# Patient Record
Sex: MALE | Race: ASIAN | NOT HISPANIC OR LATINO | URBAN - METROPOLITAN AREA
[De-identification: names, ages, dates, MRNs, and addresses within clinical notes are randomized per-mention and may not be internally consistent; named-entity substitution may affect disease eponyms.]

---

## 2018-02-08 ENCOUNTER — OFFICE VISIT (OUTPATIENT)
Dept: INTERNAL MEDICINE | Facility: CLINIC | Age: 18
End: 2018-02-08
Payer: COMMERCIAL

## 2018-02-08 VITALS
SYSTOLIC BLOOD PRESSURE: 122 MMHG | HEART RATE: 81 BPM | DIASTOLIC BLOOD PRESSURE: 78 MMHG | TEMPERATURE: 97 F | HEIGHT: 62 IN | BODY MASS INDEX: 26.86 KG/M2 | WEIGHT: 145.94 LBS | OXYGEN SATURATION: 98 %

## 2018-02-08 DIAGNOSIS — Z00.00 PREVENTATIVE HEALTH CARE: Primary | ICD-10-CM

## 2018-02-08 PROCEDURE — 99203 OFFICE O/P NEW LOW 30 MIN: CPT | Mod: PO | Performed by: FAMILY MEDICINE

## 2018-02-08 PROCEDURE — 99999 PR PBB SHADOW E&M-NEW PATIENT-LVL III: CPT | Mod: PBBFAC,,, | Performed by: FAMILY MEDICINE

## 2018-02-08 PROCEDURE — 99385 PREV VISIT NEW AGE 18-39: CPT | Mod: S$GLB,,, | Performed by: FAMILY MEDICINE

## 2018-02-08 NOTE — PROGRESS NOTES
CHIEF COMPLAINT  Annual Exam ([employer])      HISTORY OF PRESENT ILLNESS (PREVENTIVE SERVICES)    HEALTH MAINTENANCE INTERVENTIONS - UP TO DATE  The patient has no Health Maintenance topics of status Not Due    HEALTH MAINTENANCE INTERVENTIONS - DUE OR DUE SOON  Health Maintenance Due   Topic Date Due    Lipid Panel  1998    HPV Vaccines (1 of 3 - Male 3 Dose Series) 10/17/2009    TETANUS VACCINE  10/17/2016    Influenza Vaccine  08/01/2017       · HYPERTENSION SCREENING: negative  BP Readings from Last 3 Encounters:   02/08/18 122/78   05/06/17 127/84   01/09/17 (!) 118/94        · OBESITY SCREENING: negative  Wt Readings from Last 3 Encounters:   02/08/18 66.2 kg (145 lb 15.1 oz) (37 %, Z= -0.32)*   05/06/17 63.9 kg (140 lb 12.8 oz) (33 %, Z= -0.43)*   01/09/17 62.1 kg (137 lb) (29 %, Z= -0.55)*     * Growth percentiles are based on [facility] 2-20 Years data.     BMI Readings from Last 3 Encounters:   02/08/18 26.69 kg/m² (86 %, Z= 1.07)*   05/06/17 25.75 kg/m² (84 %, Z= 0.98)*   01/09/17 25.06 kg/m² (81 %, Z= 0.87)*     * Growth percentiles are based on [facility] 2-20 Years data.        · DYSLIPIDEMIA SCREENING: not indicated  No results found for: CHOL, TRIG, HDL, LDLCALC, NONHDLCHOL    · DIABETES SCREENING: not indicated  No results found for: HGBA1C, GLU    · HIV SCREENING: recommended, he gave informed refusal   No results found for: [id number]     · SEXUALLY TRANSMITTED INFECTION SCREENING: recommended, he gave informed refusal    · TOBACCO USE SCREENING: negative   reports that he has never smoked. He has never used smokeless tobacco.    · ALCOHOL MISUSE SCREENING: negative   reports that he does not drink alcohol.    · DEPRESSION SCREENING: negative    · DIET: good    · PHYSICAL ACTIVITY: excellent    · IMMUNIZATIONS: reported as up to date according to current CDC guidelines.    · VISION SCREENING: current - not needed at this time       Problem List Items Addressed This Visit     None      Visit Diagnoses      "Preventative health care    -  Primary          REVIEW OF SYSTEMS  CONSTITUTIONAL: No fever reported.  CARDIOVASCULAR: No angina reported.  PULMONARY: No hemoptysis reported.  PSYCHIATRIC: No mood instability reported.  NEUROLOGIC: No seizures reported.  ENDOCRINE: No polydipsia reported.  GASTROINTESTINAL: No blood in stool reported.  GENITOURINARY: No hematuria reported.  ENT: No acute hearing changes reported.  OPHTHALMOLOGIC: No acute vision changes reported.  HEMATOLOGIC: No bleeding problems reported.  MUSCULOSKELETAL: No recent injuries reported.  DERMATOLOGIC: No rash reported.  REMAINDER OF COMPLETE REVIEW OF SYSTEMS is negative or noncontributory to present illness except as noted herein.     PHYSICAL EXAM  Vitals:    02/08/18 1304   BP: 122/78   BP Location: Right arm   Patient Position: Sitting   BP Method: Medium (Manual)   Pulse: 81   Temp: 97.2 °F (36.2 °C)   TempSrc: Tympanic   SpO2: 98%   Weight: 66.2 kg (145 lb 15.1 oz)   Height: 5' 2" (1.575 m)     CONSTITUTIONAL: Vital signs noted. No apparent distress. Does not appear acutely ill or septic. Appears adequately hydrated.  EYE: Sclerae anicteric. Lids and conjunctiva unremarkable.  ENT: External ENT unremarkable. Oropharynx moist. Lips and tongue unremarkable. Ear canals and visualized tympanic membranes are unremarkable. Hearing grossly intact.  NECK: Trachea midline. Thyroid nontender.  LYMPH: No cervical or supraclavicular lymphadenopathy.  PULM: Lungs clear. Breathing unlabored.  CV: Auscultation reveals regular rate and rhythm without murmur, gallop or rub. No carotid bruit.   GI: Soft and nontender. Bowel sounds normal. No appreciable organomegaly or abdominal mass on palpation.  GENITOURINARY: Normal appearing external male genitalia. No inguinal hernias.  DERM: Skin warm and moist with normal turgor.  NEURO: There are no gross focal motor deficits or gross deficits of cranial nerves III-XII.  PSYCH: Alert and " oriented x 3. Mood is grossly neutral. Affect appropriate. Judgment and insight not grossly compromised.  MSK: Grossly normal stance and gait.    PAST MEDICAL HISTORY, FAMILY HISTORY, SOCIAL HISTORY, CURRENT MEDICATION LIST, and ALLERGY LIST reviewed by me (TARIQ Worthy MD) and are updated consistent with the patient's report.    ASSESSMENT and PLAN  Preventative health care        Medication List with Changes/Refills   Discontinued Medications    AMOXICILLIN (AMOXIL) 875 MG TABLET    Take 1 tablet (875 mg total) by mouth every 12 (twelve) hours.    FLUTICASONE (FLONASE) 50 MCG/ACTUATION NASAL SPRAY    1-2 sprays by Each Nare route once daily.    LORATADINE (CLARITIN) 10 MG TABLET    Take 1 tablet (10 mg total) by mouth once daily.    PROMETHAZINE-DEXTROMETHORPHAN (PROMETHAZINE-DM) 6.25-15 MG/5 ML SYRP    1 teaspoon PO Q 8 hrs PRN cough. DO NOT DRIVE AFTER TAKING MED    SULFAMETHOXAZOLE-TRIMETHOPRIM 800-160MG (BACTRIM DS) 800-160 MG TAB    Take 1 tablet by mouth 2 (two) times daily.       Follow-up in about 1 year (around 2/8/2019) for wellness and preventive services.    ABOUT THIS DOCUMENTATION:  · The order of the conditions listed in the HPI is one of convenience and does not necessarily reflect the chronology of the appointment, nor the relative importance of a condition. It is possible that additional description or status details about condition(s) may be found elsewhere in the documentation for today's encounter.  · Documentation entered by me for this encounter was done in part using speech-recognition technology. Although I have made an effort to ensure accuracy, malapropisms may exist and should be interpreted in context.                        -TARIQ Worthy MD    There are no Patient Instructions on file for this visit.

## 2019-11-27 ENCOUNTER — OFFICE VISIT (OUTPATIENT)
Dept: INTERNAL MEDICINE | Facility: CLINIC | Age: 19
End: 2019-11-27
Payer: COMMERCIAL

## 2019-11-27 VITALS
HEIGHT: 63 IN | DIASTOLIC BLOOD PRESSURE: 76 MMHG | BODY MASS INDEX: 26.83 KG/M2 | WEIGHT: 151.44 LBS | TEMPERATURE: 99 F | SYSTOLIC BLOOD PRESSURE: 110 MMHG

## 2019-11-27 DIAGNOSIS — R21 RASH: Primary | ICD-10-CM

## 2019-11-27 PROCEDURE — 3008F PR BODY MASS INDEX (BMI) DOCUMENTED: ICD-10-PCS | Mod: CPTII,S$GLB,, | Performed by: NURSE PRACTITIONER

## 2019-11-27 PROCEDURE — 3008F BODY MASS INDEX DOCD: CPT | Mod: CPTII,S$GLB,, | Performed by: NURSE PRACTITIONER

## 2019-11-27 PROCEDURE — 99214 PR OFFICE/OUTPT VISIT, EST, LEVL IV, 30-39 MIN: ICD-10-PCS | Mod: S$GLB,,, | Performed by: NURSE PRACTITIONER

## 2019-11-27 PROCEDURE — 99999 PR PBB SHADOW E&M-EST. PATIENT-LVL III: ICD-10-PCS | Mod: PBBFAC,,, | Performed by: NURSE PRACTITIONER

## 2019-11-27 PROCEDURE — 99999 PR PBB SHADOW E&M-EST. PATIENT-LVL III: CPT | Mod: PBBFAC,,, | Performed by: NURSE PRACTITIONER

## 2019-11-27 PROCEDURE — 99214 OFFICE O/P EST MOD 30 MIN: CPT | Mod: S$GLB,,, | Performed by: NURSE PRACTITIONER

## 2019-11-27 RX ORDER — TRIAMCINOLONE ACETONIDE 0.25 MG/G
CREAM TOPICAL 2 TIMES DAILY
Qty: 1 TUBE | Refills: 0 | Status: SHIPPED | OUTPATIENT
Start: 2019-11-27

## 2019-11-27 NOTE — PROGRESS NOTES
Lb Friedman 19 y.o. male     Chief Complaint:  Chief Complaint   Patient presents with    Rash     x's a few months       History of Present Illness:  Pt is new to provider, but established in practice and presents for rash to his back abdomen and left ankle for a few months.  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  Typically feels like a prick on initiation and progresses into multiple bumps.  Denies fever/chills/SOB.    Exam:  Review of Systems   Constitutional: Negative for chills, diaphoresis, fever, malaise/fatigue and weight loss.   HENT: Negative for hearing loss.    Eyes: Negative for blurred vision.   Respiratory: Negative for cough and shortness of breath.    Cardiovascular: Negative for chest pain and leg swelling.   Gastrointestinal: Negative for abdominal pain, nausea and vomiting.   Genitourinary: Negative for dysuria and hematuria.   Musculoskeletal: Negative for myalgias.   Skin: Positive for rash (back, abdomen and left ankle ).   Neurological: Negative for dizziness and headaches.   Endo/Heme/Allergies: Negative for environmental allergies.     Physical Exam   Constitutional: He is oriented to person, place, and time. He appears well-developed and well-nourished. He is cooperative. No distress.   HENT:   Head: Normocephalic and atraumatic.   Eyes: Pupils are equal, round, and reactive to light. Conjunctivae and EOM are normal. Right eye exhibits no discharge. Left eye exhibits no discharge. No scleral icterus.   Neck: Normal range of motion. No tracheal deviation present.   Cardiovascular: Normal rate and regular rhythm.   Pulmonary/Chest: Effort normal and breath sounds normal. No stridor. No respiratory distress. He has no wheezes. He has no rales. He exhibits no tenderness.   Abdominal: Soft. Bowel sounds are normal. He exhibits no distension. There is no tenderness.   Musculoskeletal: Normal range of motion. He exhibits no edema.    Neurological: He is alert and oriented to person, place, and time.   Skin: Skin is warm, dry and intact. Rash noted. Rash is vesicular. He is not diaphoretic. No erythema. No pallor.        Scattered vesicular rashes, nondraining    Psychiatric: He has a normal mood and affect. His speech is normal and behavior is normal. Judgment and thought content normal. Cognition and memory are normal.       Most Recent Laboratory Results Reviewed ({N/A)  No results found for: WBC, HGB, HCT, PLT, CHOL, TRIG, HDL, LDLDIRECT, ALT, AST, NA, K, CL, CREATININE, BUN, CO2, TSH, PSA, INR, GLUF, HGBA1C, MICROALBUR    Assessment     ICD-10-CM ICD-9-CM   1. Rash R21 782.1        Plan   Rash  -     triamcinolone acetonide 0.025% (KENALOG) 0.025 % cream; Apply topically 2 (two) times daily.  Dispense: 1 Tube; Refill: 0  -     Ambulatory Referral to Dermatology         Follow up in about 3 months (around 2/27/2020), or if symptoms worsen or fail to improve, for annual.

## 2019-12-02 ENCOUNTER — INITIAL CONSULT (OUTPATIENT)
Dept: DERMATOLOGY | Facility: CLINIC | Age: 19
End: 2019-12-02
Payer: COMMERCIAL

## 2019-12-02 DIAGNOSIS — L30.9 DERMATITIS: Primary | ICD-10-CM

## 2019-12-02 DIAGNOSIS — L30.9 ECZEMA, UNSPECIFIED TYPE: ICD-10-CM

## 2019-12-02 PROCEDURE — 88312 SPECIAL STAINS GROUP 1: CPT | Performed by: PATHOLOGY

## 2019-12-02 PROCEDURE — 99999 PR PBB SHADOW E&M-EST. PATIENT-LVL III: CPT | Mod: PBBFAC,,, | Performed by: PHYSICIAN ASSISTANT

## 2019-12-02 PROCEDURE — 11104 PR PUNCH BIOPSY, SKIN, SINGLE LESION: ICD-10-PCS | Mod: S$GLB,,, | Performed by: PHYSICIAN ASSISTANT

## 2019-12-02 PROCEDURE — 11105 PR PUNCH BIOPSY, SKIN, EA ADDTL LESION: ICD-10-PCS | Mod: S$GLB,,, | Performed by: PHYSICIAN ASSISTANT

## 2019-12-02 PROCEDURE — 88305 TISSUE EXAM BY PATHOLOGIST: CPT | Mod: 26,,, | Performed by: PATHOLOGY

## 2019-12-02 PROCEDURE — 11104 PUNCH BX SKIN SINGLE LESION: CPT | Mod: S$GLB,,, | Performed by: PHYSICIAN ASSISTANT

## 2019-12-02 PROCEDURE — 88312 SPECIAL STAINS GROUP 1: CPT | Mod: 26,,, | Performed by: PATHOLOGY

## 2019-12-02 PROCEDURE — 99202 PR OFFICE/OUTPT VISIT, NEW, LEVL II, 15-29 MIN: ICD-10-PCS | Mod: 25,S$GLB,, | Performed by: PHYSICIAN ASSISTANT

## 2019-12-02 PROCEDURE — 88305 TISSUE EXAM BY PATHOLOGIST: ICD-10-PCS | Mod: 26,,, | Performed by: PATHOLOGY

## 2019-12-02 PROCEDURE — 99202 OFFICE O/P NEW SF 15 MIN: CPT | Mod: 25,S$GLB,, | Performed by: PHYSICIAN ASSISTANT

## 2019-12-02 PROCEDURE — 88305 TISSUE EXAM BY PATHOLOGIST: CPT | Mod: 59 | Performed by: PATHOLOGY

## 2019-12-02 PROCEDURE — 11105 PUNCH BX SKIN EA SEP/ADDL: CPT | Mod: S$GLB,,, | Performed by: PHYSICIAN ASSISTANT

## 2019-12-02 PROCEDURE — 99999 PR PBB SHADOW E&M-EST. PATIENT-LVL III: ICD-10-PCS | Mod: PBBFAC,,, | Performed by: PHYSICIAN ASSISTANT

## 2019-12-02 PROCEDURE — 88312 PR  SPECIAL STAINS,GROUP I: ICD-10-PCS | Mod: 26,,, | Performed by: PATHOLOGY

## 2019-12-02 RX ORDER — HYDROXYZINE HYDROCHLORIDE 10 MG/1
10 TABLET, FILM COATED ORAL NIGHTLY PRN
Qty: 30 TABLET | Refills: 0 | Status: SHIPPED | OUTPATIENT
Start: 2019-12-02 | End: 2020-01-01

## 2019-12-02 RX ORDER — PERMETHRIN 50 MG/G
CREAM TOPICAL
Qty: 60 G | Refills: 1 | Status: SHIPPED | OUTPATIENT
Start: 2019-12-02

## 2019-12-02 NOTE — PROGRESS NOTES
"elim  Subjective:       Patient ID:  Lb Friedman is a 19 y.o. male who presents for   Chief Complaint   Patient presents with    Rash     whole body x couple months      History of Present Illness: The patient presents with chief complaint of rash. Was seen initially 11/27/19 by SHREYAS Paz and prescribed TAC cream. 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  Denies h/o eczema, but admits to seasonal allergies. Notes brother with eczema.  Denies new soaps, detergents. 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   Location: anterior trunk, low back, left ankle  Duration: x couple months  Signs/Symptoms: itchy, scattered, clusters of bumps, rough, dry    Prior treatments: OTC allergy meds and TAC     C/o scattered bumps or 'hives" of the bilateral arms, duration: several days. +Redness, scattered bumps, itching. Previous tx: Calamine lotion, zyrtec, xyzal, TAC 0.025% cream, otc fungal cream      Review of Systems   Constitutional: Negative for fever and chills.   Gastrointestinal: Negative for nausea and vomiting.   Skin: Positive for itching, rash, dry skin and activity-related sunscreen use. Negative for daily sunscreen use and recent sunburn.   Hematologic/Lymphatic: Does not bruise/bleed easily.        Objective:    Physical Exam   Constitutional: He appears well-developed and well-nourished. No distress.   Neurological: He is alert and oriented to person, place, and time. He is not disoriented.   Psychiatric: He has a normal mood and affect.   Skin:   Areas Examined (abnormalities noted in diagram):   Head / Face Inspection Performed  Neck Inspection Performed  Chest / Axilla Inspection Performed  Abdomen Inspection Performed  Genitals / Buttocks / Groin Inspection Performed  Back Inspection Performed  RUE Inspected  LUE Inspection Performed  RLE Inspected  LLE Inspection Performed  Nails and Digits Inspection Performed              Diagram " Legend     Erythematous scaling macule/papule c/w actinic keratosis       Vascular papule c/w angioma      Pigmented verrucoid papule/plaque c/w seborrheic keratosis      Yellow umbilicated papule c/w sebaceous hyperplasia      Irregularly shaped tan macule c/w lentigo     1-2 mm smooth white papules consistent with Milia      Movable subcutaneous cyst with punctum c/w epidermal inclusion cyst      Subcutaneous movable cyst c/w pilar cyst      Firm pink to brown papule c/w dermatofibroma      Pedunculated fleshy papule(s) c/w skin tag(s)      Evenly pigmented macule c/w junctional nevus     Mildly variegated pigmented, slightly irregular-bordered macule c/w mildly atypical nevus      Flesh colored to evenly pigmented papule c/w intradermal nevus       Pink pearly papule/plaque c/w basal cell carcinoma      Erythematous hyperkeratotic cursted plaque c/w SCC      Surgical scar with no sign of skin cancer recurrence      Open and closed comedones      Inflammatory papules and pustules      Verrucoid papule consistent consistent with wart     Erythematous eczematous patches and plaques     Dystrophic onycholytic nail with subungual debris c/w onychomycosis     Umbilicated papule    Erythematous-base heme-crusted tan verrucoid plaque consistent with inflamed seborrheic keratosis     Erythematous Silvery Scaling Plaque c/w Psoriasis     See annotation                      Assessment / Plan:      Pathology Orders:     Normal Orders This Visit    Specimen to Pathology, Dermatology     Questions:    Procedure Type:  Dermatology and skin neoplasms    Number of Specimens:  2    ------------------------:  -------------------------    Spec 1 Procedure:  Biopsy    Spec 1 Clinical Impression:  r/o scabies vs. other    Spec 1 Source:  left arm (A)    ------------------------:  -------------------------    Spec 2 Procedure:  Biopsy    Spec 2 Clinical Impression:  r/o scabies vs. other    Spec 2 Source:  left arm (B)         Dermatitis  -     Specimen to Pathology, Dermatology  -     permethrin (ELIMITE) 5 % cream; Apply from neck down to toes at bedtime x 1, wash off after 8 hours. Then, repeat x 1 in 14 days.  Dispense: 60 g; Refill: 1  PROCEDURE NOTE -- PUNCH BIOPSY  Location: left arm (A)    After risks, benefits, and alternatives were discussed with the patient, the patient agrees to the procedure by verbal consent. The area(s) is cleansed with alcohol. A total of 1 cc of lidocaine 1% with epinephrine and sodium bicarbonate was injected for local anesthesia. A 3 mm punch biopsy was used to remove part or all of the lesion(s). The specimen was sent for tissue pathology. The defect(s) was then packed with gelfoam. The area was dressed with antibiotic ointment and bandaged. The patient tolerated the procedure well without adverse events.  Wound care instructions were given to the patient on the AVS.  The patient will be notified of pathology results once available. Results will also be available in Epic.      PROCEDURE NOTE -- PUNCH BIOPSY  Location: left arm (B)    After risks, benefits, and alternatives were discussed with the patient, the patient agrees to the procedure by verbal consent. The area(s) is cleansed with alcohol. A total of 1 cc of lidocaine 1% with epinephrine and sodium bicarbonate was injected for local anesthesia. A 3 mm punch biopsy was used to remove part or all of the lesion(s). The specimen was sent for tissue pathology. The defect(s) was then packed with gelfoam. The area was dressed with antibiotic ointment and bandaged. The patient tolerated the procedure well without adverse events.  Wound care instructions were given to the patient on the AVS.  The patient will be notified of pathology results once available. Results will also be available in Epic.    Will start Permethrin cream and repeat in 14 days. Discussed application and repeat tx in detail with patient. Advised to launder linens and pjs in hot  water.  Discussed risk of transmission to other with contact. Will provided school excuse today.  Stop TAC for now. Start hydroxyzine qhs, discussed risk of sedation. May continue xyzal or zyrtec qAM prn.        Eczema, unspecified type  Will reconsider medium potency TCS in future.          Follow up in about 4 weeks (around 12/30/2019).

## 2019-12-02 NOTE — LETTER
December 2, 2019      [place]  [address] [place] [address]           [facility]  [address]  [place] [address]  Phone: [phone]  Fax: [phone]          Patient: Lb Friedman   MR Number: [id number]   YOB: 2000   Date of Visit: 12/2/2019       Dear Tejal Paz:    Thank you for referring Lb Friedman to me for evaluation. Attached you will find relevant portions of my assessment and plan of care.    If you have questions, please do not hesitate to call me. I look forward to following Lb Friedman along with you.    Sincerely,    Lyudmila Parker PA-C    Enclosure  CC:  No Recipients    If you would like to receive this communication electronically, please contact [email or web address] or [phone] to request more information on [employer] access.    For providers and/or their staff who would like to refer a patient to [facility], please contact us through our one-stop-shop provider referral line, [facility], at [phone].    If you feel you have received this communication in error or would no longer like to receive these types of communications, please e-mail [email or web address]

## 2019-12-02 NOTE — PATIENT INSTRUCTIONS

## 2019-12-09 DIAGNOSIS — L30.9 ECZEMA, UNSPECIFIED TYPE: ICD-10-CM

## 2019-12-09 DIAGNOSIS — L30.9 DERMATITIS: Primary | ICD-10-CM

## 2019-12-09 LAB
FINAL PATHOLOGIC DIAGNOSIS: NORMAL
GROSS: NORMAL

## 2019-12-09 NOTE — PROGRESS NOTES
Subjective:       Patient ID:  Lb Friedman is a 19 y.o. male who presents for No chief complaint on file.    HPI    Review of Systems     Objective:    Physical Exam       Diagram Legend     Erythematous scaling macule/papule c/w actinic keratosis       Vascular papule c/w angioma      Pigmented verrucoid papule/plaque c/w seborrheic keratosis      Yellow umbilicated papule c/w sebaceous hyperplasia      Irregularly shaped tan macule c/w lentigo     1-2 mm smooth white papules consistent with Milia      Movable subcutaneous cyst with punctum c/w epidermal inclusion cyst      Subcutaneous movable cyst c/w pilar cyst      Firm pink to brown papule c/w dermatofibroma      Pedunculated fleshy papule(s) c/w skin tag(s)      Evenly pigmented macule c/w junctional nevus     Mildly variegated pigmented, slightly irregular-bordered macule c/w mildly atypical nevus      Flesh colored to evenly pigmented papule c/w intradermal nevus       Pink pearly papule/plaque c/w basal cell carcinoma      Erythematous hyperkeratotic cursted plaque c/w SCC      Surgical scar with no sign of skin cancer recurrence      Open and closed comedones      Inflammatory papules and pustules      Verrucoid papule consistent consistent with wart     Erythematous eczematous patches and plaques     Dystrophic onycholytic nail with subungual debris c/w onychomycosis     Umbilicated papule    Erythematous-base heme-crusted tan verrucoid plaque consistent with inflamed seborrheic keratosis     Erythematous Silvery Scaling Plaque c/w Psoriasis     See annotation      Assessment / Plan:        There are no diagnoses linked to this encounter.         No follow-ups on file.

## 2019-12-11 RX ORDER — TRIAMCINOLONE ACETONIDE 1 MG/G
CREAM TOPICAL 2 TIMES DAILY
Qty: 80 G | Refills: 0 | Status: SHIPPED | OUTPATIENT
Start: 2019-12-11

## 2020-01-23 ENCOUNTER — OFFICE VISIT (OUTPATIENT)
Dept: INTERNAL MEDICINE | Facility: CLINIC | Age: 20
End: 2020-01-23
Payer: COMMERCIAL

## 2020-01-23 VITALS
DIASTOLIC BLOOD PRESSURE: 62 MMHG | HEIGHT: 62 IN | SYSTOLIC BLOOD PRESSURE: 102 MMHG | HEART RATE: 87 BPM | OXYGEN SATURATION: 98 % | TEMPERATURE: 97 F | WEIGHT: 149.94 LBS | BODY MASS INDEX: 27.59 KG/M2

## 2020-01-23 DIAGNOSIS — S13.4XXA WHIPLASH INJURY TO NECK, INITIAL ENCOUNTER: ICD-10-CM
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
## 2020-01-23 DIAGNOSIS — M79.10 GENERALIZED MUSCLE ACHE: ICD-10-CM

## 2020-01-23 PROCEDURE — 99999 PR PBB SHADOW E&M-EST. PATIENT-LVL IV: ICD-10-PCS | Mod: PBBFAC,,, | Performed by: NURSE PRACTITIONER

## 2020-01-23 PROCEDURE — 99999 PR PBB SHADOW E&M-EST. PATIENT-LVL IV: CPT | Mod: PBBFAC,,, | Performed by: NURSE PRACTITIONER

## 2020-01-23 PROCEDURE — 3008F PR BODY MASS INDEX (BMI) DOCUMENTED: ICD-10-PCS | Mod: CPTII,S$GLB,, | Performed by: NURSE PRACTITIONER

## 2020-01-23 PROCEDURE — 3008F BODY MASS INDEX DOCD: CPT | Mod: CPTII,S$GLB,, | Performed by: NURSE PRACTITIONER

## 2020-01-23 PROCEDURE — 99214 PR OFFICE/OUTPT VISIT, EST, LEVL IV, 30-39 MIN: ICD-10-PCS | Mod: S$GLB,,, | Performed by: NURSE PRACTITIONER

## 2020-01-23 PROCEDURE — 99214 OFFICE O/P EST MOD 30 MIN: CPT | Mod: S$GLB,,, | Performed by: NURSE PRACTITIONER

## 2020-01-23 RX ORDER — MELOXICAM 15 MG/1
15 TABLET ORAL DAILY
Qty: 14 TABLET | Refills: 0 | Status: SHIPPED | OUTPATIENT
Start: 2020-01-23

## 2020-01-23 RX ORDER — CYCLOBENZAPRINE HCL 5 MG
5 TABLET ORAL NIGHTLY PRN
Qty: 7 TABLET | Refills: 0 | Status: SHIPPED | OUTPATIENT
Start: 2020-01-23 | End: 2020-02-02
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
## 2020-01-23 NOTE — LETTER
January 23, 2020                 [facility]  Internal Medicine  [address] [facility]  [place] [address]  Phone: [phone]  Fax: [phone]   January 23, 2020     Patient: Lb Friedman   YOB: 2000   Date of Visit: 1/23/2020       To Whom it May Concern:    Lb Friedman was seen in my clinic on 1/23/2020. He may return with no restrictions on 1/27/2019.    Please excuse him from any classes or work missed.    If you have any questions or concerns, please don't hesitate to call.    Sincerely,         Rianna Gonzalez NP
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
## 2020-10-19 ENCOUNTER — CLINICAL SUPPORT (OUTPATIENT)
Dept: REHABILITATION | Facility: OTHER | Age: 20
End: 2020-10-19
Payer: COMMERCIAL

## 2020-10-19 DIAGNOSIS — M62.81 MUSCLE WEAKNESS OF LOWER EXTREMITY: ICD-10-CM

## 2020-10-19 DIAGNOSIS — R10.2 ACUTE PELVIC PAIN: ICD-10-CM

## 2020-10-19 DIAGNOSIS — Z74.09 IMPAIRED FUNCTIONAL MOBILITY, BALANCE, GAIT, AND ENDURANCE: ICD-10-CM

## 2020-10-19 PROCEDURE — 97161 PT EVAL LOW COMPLEX 20 MIN: CPT | Mod: PN

## 2020-10-19 PROCEDURE — 97110 THERAPEUTIC EXERCISES: CPT | Mod: PN

## 2020-10-19 NOTE — PATIENT INSTRUCTIONS
GLUTE SET - SUPINE        While lying on your back, squeeze your buttocks and hold. Repeat. The opposite knee can be bent or straight at your therapists discretion.     Perform 10 reps holding for 10 seconds.    Perform twice a day.    Copyright © 8268-8335 [employer]      HIP ABDUCTION - SIDELYING            While lying on your side, slowly raise up your top leg to the side. Keep your knee straight and maintain your toes pointed forward the entire time. Keep your leg in-line with your body.  The bottom leg can be bent to stabilize your body.    Hold and then lower yourself and repeat. Perform 2 sets of 10 reps holding for 5 seconds.    Perform twice a day.    Copyright © 0454-8167 [employer]    CLAM SHELLS      While lying on your side with your knees bent, draw up the top knee while keeping contact of your feet together.    Do not let your pelvis roll back during the lifting movement.    Hold and then lower yourself and repeat. Perform 20 reps holding for 5 seconds.    Perform twice a day.    Copyright © 2900-5579 [employer]    SIDELYING REVERSE CLAM SHELL - REVERSE CLAMSHELL        While lying on your side with your knees bent, raise your top foot towards the ceiling while keeping contact of your knees together. Then, lower back down to original position.     Do not let your pelvis roll forward during the lifting movement.      Hold and then lower yourself and repeat. Perform 20 reps holding for 5 seconds.    Perform twice a day.    Copyright © 4201-9741 [employer]

## 2020-10-20 PROBLEM — Z74.09 IMPAIRED FUNCTIONAL MOBILITY, BALANCE, GAIT, AND ENDURANCE: Status: ACTIVE | Noted: 2020-10-20

## 2020-10-20 PROBLEM — M62.81 MUSCLE WEAKNESS OF LOWER EXTREMITY: Status: ACTIVE | Noted: 2020-10-20

## 2020-10-20 PROBLEM — R10.2 ACUTE PELVIC PAIN: Status: ACTIVE | Noted: 2020-10-20

## 2020-10-20 NOTE — PLAN OF CARE
[facility]  Physical Therapy Initial Evaluation    Name: Lb Friedman  Clinic Number: [id number]    Therapy Diagnosis:   Encounter Diagnoses   Name Primary?    Acute pelvic pain     Impaired functional mobility, balance, gait, and endurance     Muscle weakness of lower extremity      Physician: Fantasma Torres PA    Physician Orders: PT Eval and Treat   Medical Diagnosis: S32.591A (ICD-10-CM) - Closed fracture of ramus of right pubis, S22.41XA (ICD-10-CM) - Closed fracture of multiple ribs of right side, S32.82XA (ICD-10-CM) - Multiple closed fractures of pelvis  Evaluation Date: 10/19/2020  Authorization Period Expiration: 12/31/2020  Plan of Care Certification Period: 12/14/2020  Visit # / Visits authorized: 1/ 20 (no     Time In: 2:00 PM  Time Out: 2:41 PM  Total Billable Time: 41 minutes    Precautions: Standard and WBAT    Subjective   Date of onset: 10/9/2020  History of current condition - Lb is a 19 yo M who sustained a closed fx of his R pubis, T7 vertebra compression fracture, R 6-8 rib fractures, T3-T10 TP fx, and superior end plate T4 compression fracture. secondary jumping from a 3 story parking garage in [place] on 10/9/2020. Patient immediately taken to [facility] in . Patient stayed in [facility] for approximately 7 days.     PMed Hx: non contributing  Lb Friedman no surgical interventions    Lb has a current medication list which includes the following prescription(s): meloxicam, permethrin, triamcinolone acetonide 0.025%, and triamcinolone acetonide 0.1%.    Review of patient's allergies indicates:  No Known Allergies     Imaging,    Prior Therapy: OT and PT while in patient  Social History: lives with their family in one story home.  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    Pain:  Current 7/10,  "worst 9/10, best 4/10   Location: right tailbone   Description: Sharp, Shooting and constant  Aggravating Factors: sitting, getting out of bed/chair, twisting, bending, and turning in bed.  Easing Factors: relaxation and pain medication    Pts goals: "to be able to run." states "the [employer] will not let me commission if I cannot run."    Objective     Wearing a TLSO brace. Ambulating with r.w.  Functional assessment:   - walking: amb with r.w.  - sit to stand: mod I    AROM  LE MMT  R  L    Hip flexion  3/5  3/5    Hip abduction  3/5  3/5    Hip extension  3-/5  3/5    Hip ER  3/5  3/5    Hip IR  3/5  3/5    Knee extension  3/5  3/5    Knee flexion  3/5  3/5    Ankle dorsiflexion  3/5  3/5    Ankle plantar flexion  3/5  3/5    Ankle inversion  3/5  3/5    Ankle eversion  3/5  3/5      No resistance given for MMT    Flexibility testing:  - hamstrings:         B: WNL        - gastrocnemius:   B: WNL  - piriformis:            B: WNL   - quadriceps:         B: WNL  - hip adductors:     B: WNL      CMS Impairment/Limitation/Restriction for FOTO Pelvis Survey    Therapist reviewed FOTO scores for Lb Friedman on 10/19/2020.   FOTO documents entered into [employer] - see Media section.    Limitation Score: 77%  Category: Body Position    Current : CL = least 60% but < 80% impaired, limited or restricted  Goal: CJ = at least 20% but < 40% impaired, limited or restricted       TREATMENT   Treatment Time In: 2:27 PM  Treatment Time Out: 2:41 PM  Total Treatment time separate from Evaluation time: 14 minutes    Lb received therapeutic exercises to develop strength, ROM, flexibility, posture and core stabilization for 14 minutes including:  Side lying clams  Side lying hip abd  Side lying reverse clams  Gluteal sets    Home Exercises Provided and Patient Education Provided     Education provided:   - Discussed the role of the PTA on the Rehab Team. Also discussed the use of the [facility] for communication.    Side lying " clams  Side lying hip abd  Side lying reverse clams  Gluteal sets    Written Home Exercises Provided: yes.  Exercises were reviewed and Lb was able to demonstrate them prior to the end of the session.  Lb demonstrated good  understanding of the education provided.     See EMR under Patient Instructions for exercises provided 10/19/2020.  Assessment   Lb is a 20 y.o. male referred to outpatient Physical Therapy with a medical diagnosis of S32.591A (ICD-10-CM) - Closed fracture of ramus of right pubis, S22.41XA (ICD-10-CM) - Closed fracture of multiple ribs of right side, S32.82XA (ICD-10-CM) - Multiple closed fractures of pelvis. Pt presents with decreased B LE strength, impaired gait to therapy today. WIll proceed in OP PT with B LE strengthening, gait training, and balance training to return patient to previous level of function.    Pt prognosis is Excellent.   Pt will benefit from skilled outpatient Physical Therapy to address the deficits stated above and in the chart below, provide pt/family education, and to maximize pt's level of independence.     Plan of care discussed with patient: Yes  Pt's spiritual, cultural and educational needs considered and patient is agreeable to the plan of care and goals as stated below:     Anticipated Barriers for therapy: none    Medical Necessity is demonstrated by the following  History  Co-morbidities and personal factors that may impact the plan of care Co-morbidities:   none    Personal Factors:   no deficits     low   Examination  Body Structures and Functions, activity limitations and participation restrictions that may impact the plan of care Body Regions:   back  lower extremities  upper extremities  trunk    Body Systems:    strength  gross coordinated movement  balance  gait  transfers    Participation Restrictions:   none    Activity limitations:   Learning and applying knowledge  no deficits    General Tasks and Commands  no deficits    Communication  no  deficits    Mobility  lifting and carrying objects  walking    Self care  washing oneself (bathing, drying, washing hands)  dressing    Domestic Life  doing house work (cleaning house, washing dishes, laundry)    Interactions/Relationships  no deficits    Life Areas  no deficits    Community and Social Life  no deficits         high   Clinical Presentation evolving clinical presentation with changing clinical characteristics moderate   Decision Making/ Complexity Score: low     Goals:  Short Term Goals: 4 weeks   1. Independent with HEP.  2. Report decreased R LE/tailbone pain < or =  5/10 with adls such assitting, getting out of bed/chair, twisting, bending, and turning in bed.   3. Increased MMT for B LE by 1 muscle grade to promote proper pelvic stability to decrease R LE/tailbone pain < or =  5/10 with adls such assitting, getting out of bed/chair, twisting, bending, and turning in bed.    4. Community ambulation with cane with mod I.    Long Term Goals: 8 weeks   5. Report decreased R LE/tailbone < or =  3/10 with adls such as sitting, getting out of bed/chair, twisting, bending, and turning in bed.  6. Increased MMT for B LE to 5/5 muscle grade to promote proper pelvic stability to decrease R LE/tailbone < or =  3/10 with adls such as sitting, getting out of bed/chair, twisting, bending, and turning in bed.  7. I community ambulation.   8. Patient to achieve CJ (at least 20% < 40% impaired, limited or restricted) level on the FOTO Outcomes Measurement System.    Plan   Certification Period/Plan of care expiration: 10/19/2020 to 12/14/2020.    Outpatient Physical Therapy 2 times weekly for 8 weeks to include the following interventions: Gait Training, Manual Therapy, Moist Heat/ Ice, Neuromuscular Re-ed, Patient Education and Therapeutic Exercise.     Osvaldo Finley, PT

## 2020-10-23 ENCOUNTER — CLINICAL SUPPORT (OUTPATIENT)
Dept: REHABILITATION | Facility: OTHER | Age: 20
End: 2020-10-23
Payer: COMMERCIAL

## 2020-10-23 DIAGNOSIS — Z74.09 IMPAIRED FUNCTIONAL MOBILITY, BALANCE, GAIT, AND ENDURANCE: ICD-10-CM

## 2020-10-23 DIAGNOSIS — M62.81 MUSCLE WEAKNESS OF LOWER EXTREMITY: ICD-10-CM

## 2020-10-23 DIAGNOSIS — R10.2 ACUTE PELVIC PAIN: ICD-10-CM

## 2020-10-23 PROCEDURE — 97110 THERAPEUTIC EXERCISES: CPT | Mod: PN,CQ

## 2020-10-23 NOTE — PROGRESS NOTES
"    Physical Therapy Daily Treatment Note     Name: Lb Friedman  Clinic Number: [id number]    Therapy Diagnosis:   Encounter Diagnoses   Name Primary?    Acute pelvic pain     Impaired functional mobility, balance, gait, and endurance     Muscle weakness of lower extremity      Physician: Fantasma Torres PA    Visit Date: 10/23/2020    Physician Orders: PT Eval and Treat   Medical Diagnosis: S32.591A (ICD-10-CM) - Closed fracture of ramus of right pubis, S22.41XA (ICD-10-CM) - Closed fracture of multiple ribs of right side, S32.82XA (ICD-10-CM) - Multiple closed fractures of pelvis  Evaluation Date: 10/19/2020  Authorization Period Expiration: 12/31/2020  Plan of Care Certification Period: 12/14/2020  Visit # / Visits authorized: 2/ 20 (no     Time In: 0745  Time Out: 0830  Total Billable Time: 45 minutes    Precautions: Standard and WBAT      Subjective     Pt reports: feeling pretty sore in his low back. States he has been performing his exercises as directed and they are not causing much pain.   He was compliant with home exercise program.  Response to previous treatment: tolerated well  Functional change: na    Prior Level of Function: I with amb and ADL  Current Level of Function: amb with walker, A with dressing and putting on brace.     Pain:  Current 7/10, worst 9/10, best 4/10   Location: right tailbone   Description: Sharp, Shooting and constant  Aggravating Factors: sitting, getting out of bed/chair, twisting, bending, and turning in bed.  Easing Factors: relaxation and pain medication     Pts goals: "to be able to run." states "[employer] will not let me commission if I cannot run."      Objective     Lb received therapeutic exercises to develop strength, ROM, flexibility, posture and core stabilization for 45 minutes including:    Side lying clams 2x10  Side lying hip abd 2x10  Side lying reverse clams 2x10  Gluteal sets 10x10"  +TrA 15x5"  +Ball squeezes sub-maximal 30x  +Hook-lying hip abd " sub-maximal 30x  +HR in RW 30x  +St. HS curls 20x  +St. Hip abd 20x  +Mini squats 20x         Lb received the following manual therapy techniques: Myofacial release and Soft tissue Mobilization were applied to the: 00 for 00 minutes, including:  NP    Lb participated in neuromuscular re-education activities to improve: Balance, Coordination, Kinesthetic, Sense, Proprioception and Posture for 00 minutes. The following activities were included:  NP    Lb participated in gait training to improve functional mobility and safety for 00  minutes, including:  NP      Lb received hot pack for 00 minutes to NP.    Lb received cold pack for 00 minutes to NP.    **Taken at Initial Evaluation **                  10/19/2020    Wearing a TLSO brace. Ambulating with r.w.  Functional assessment:   - walking: amb with r.w.  - sit to stand: mod I     AROM  LE MMT  R  L    Hip flexion  3/5  3/5    Hip abduction  3/5  3/5    Hip extension  3-/5  3/5    Hip ER  3/5  3/5    Hip IR  3/5  3/5    Knee extension  3/5  3/5    Knee flexion  3/5  3/5    Ankle dorsiflexion  3/5  3/5    Ankle plantar flexion  3/5  3/5    Ankle inversion  3/5  3/5    Ankle eversion  3/5  3/5       No resistance given for MMT     Flexibility testing:  - hamstrings:         B: WNL        - gastrocnemius:   B: WNL  - piriformis:            B: WNL   - quadriceps:         B: WNL  - hip adductors:     B: WNL       Home Exercises Provided and Patient Education Provided     Education provided:   - Pt education regarding proper technique for there-ex/HEP, as well as rationale for exercise/ POC.       Written Home Exercises Provided: Patient instructed to cont prior HEP.  Exercises were reviewed and Lb was able to demonstrate them prior to the end of the session.  Lb demonstrated good  understanding of the education provided.     See EMR under Patient Instructions for exercises provided prior visit.    Assessment     Pt tolerated exercise well  with minimal complaints of increased pain beyond stated level.  Muscle weakness in paraspinals/core musculature were notable during exercises. Pt was able to demonstrate improved core/transverse abdominis activation after cuing. Progressed standing exercises with good tolerance noted. Pt required verbal/tactile cues to initiate exercise and to correct form. PT/PTA face to face conference with Julio Finley DPT concerning pt status/TX. Pt reported feeling well post TX.       Lb is progressing well towards his goals.   Pt prognosis is Excellent.       Pt will continue to benefit from skilled outpatient physical therapy to address the deficits listed in the problem list box on initial evaluation, provide pt/family education and to maximize pt's level of independence in the home and community environment.     Pt's spiritual, cultural and educational needs considered and pt agreeable to plan of care and goals.     Anticipated Barriers for therapy: none    Goals:  Short Term Goals: 4 weeks   1. Independent with HEP.  2. Report decreased R LE/tailbone pain < or =  5/10 with adls such assitting, getting out of bed/chair, twisting, bending, and turning in bed.   3. Increased MMT for B LE by 1 muscle grade to promote proper pelvic stability to decrease R LE/tailbone pain < or =  5/10 with adls such assitting, getting out of bed/chair, twisting, bending, and turning in bed.    4. Community ambulation with cane with mod I.     Long Term Goals: 8 weeks   5. Report decreased R LE/tailbone < or =  3/10 with adls such as sitting, getting out of bed/chair, twisting, bending, and turning in bed.  6. Increased MMT for B LE to 5/5 muscle grade to promote proper pelvic stability to decrease R LE/tailbone < or =  3/10 with adls such as sitting, getting out of bed/chair, twisting, bending, and turning in bed.  7. I community ambulation.   8. Patient to achieve CJ (at least 20% < 40% impaired, limited or restricted) level on the FOTO  Outcomes Measurement System.      Plan   Certification Period/Plan of care expiration: 10/19/2020 to 12/14/2020.    Continue with current POC for further strengthening, flexibility, improve ROM and ADL performance in accordance with goals set forth by supervising PT. Will progress core/postural stabilization there-ex as tolerated.        Outpatient Physical Therapy 2 times weekly for 8 weeks to include the following interventions: Gait Training, Manual Therapy, Moist Heat/ Ice, Neuromuscular Re-ed, Patient Education and Therapeutic Exercise.       Luis Lozoya, PTA

## 2020-11-03 ENCOUNTER — CLINICAL SUPPORT (OUTPATIENT)
Dept: REHABILITATION | Facility: OTHER | Age: 20
End: 2020-11-03
Payer: COMMERCIAL

## 2020-11-03 DIAGNOSIS — Z74.09 IMPAIRED FUNCTIONAL MOBILITY, BALANCE, GAIT, AND ENDURANCE: ICD-10-CM

## 2020-11-03 DIAGNOSIS — R10.2 ACUTE PELVIC PAIN: ICD-10-CM

## 2020-11-03 DIAGNOSIS — M62.81 MUSCLE WEAKNESS OF LOWER EXTREMITY: ICD-10-CM

## 2020-11-03 PROCEDURE — 97110 THERAPEUTIC EXERCISES: CPT | Mod: PN,CQ

## 2020-11-03 NOTE — PROGRESS NOTES
"    Physical Therapy Daily Treatment Note     Name: Lb Friedman  Clinic Number: [id number]    Therapy Diagnosis:   Encounter Diagnoses   Name Primary?    Acute pelvic pain     Impaired functional mobility, balance, gait, and endurance     Muscle weakness of lower extremity      Physician: Fantasma Torres PA    Visit Date: 11/3/2020    Physician Orders: PT Eval and Treat   Medical Diagnosis: S32.591A (ICD-10-CM) - Closed fracture of ramus of right pubis, S22.41XA (ICD-10-CM) - Closed fracture of multiple ribs of right side, S32.82XA (ICD-10-CM) - Multiple closed fractures of pelvis  Evaluation Date: 10/19/2020  Authorization Period Expiration: 12/31/2020  Plan of Care Certification Period: 12/14/2020  Visit # / Visits authorized: 3/ 20     Time In: 1656  Time Out: 1735   Total Billable Time: 39 minutes    Precautions: Standard and WBAT      Subjective     Pt states feeling better w/ no c/o pn currently.  Pt mentioned "it feels like I have a rock under me when I sit."   He was compliant with home exercise program.  Response to previous treatment:no adverse effects  Functional change: no change     Prior Level of Function: I with amb and ADL  Current Level of Function: amb with walker, A with dressing and putting on brace.     Pain:  Current 7/10, worst 9/10, best 4/10   Location: right tailbone   Description: Sharp, Shooting and constant  Aggravating Factors: sitting, getting out of bed/chair, twisting, bending, and turning in bed.  Easing Factors: relaxation and pain medication     Pts goals: "to be able to run." states "[employer] will not let me commission if I cannot run."      Objective     Lb received therapeutic exercises to develop strength, ROM, flexibility, posture and core stabilization for 39 minutes including:    Side lying clams 2x15  Side lying hip abd 3 x 10  Side lying reverse clams 2x15  Gluteal sets 10x10"  +TrA 20 x 3 sec hold   +Ball squeezes sub-maximal 30x 3 sec hold   +Hook-lying hip " abd sub-maximal 30x 3 sec hold   +HR in RW 30x  +St. HS curls 20x 2# B   +St. Hip abd 20 x   +Mini squats 30 x (VCs needed)          Lb received the following manual therapy techniques: Myofacial release and Soft tissue Mobilization were applied to the: 00 for 00 minutes, including:  NP    Lb participated in neuromuscular re-education activities to improve: Balance, Coordination, Kinesthetic, Sense, Proprioception and Posture for 00 minutes. The following activities were included:  NP    Lb participated in gait training to improve functional mobility and safety for 00  minutes, including:  NP      Lb received hot pack for 00 minutes to NP.    Lb received cold pack for 00 minutes to NP.    Home Exercises Provided and Patient Education Provided     Education provided:   Pt edu on proper exercise technique.        Written Home Exercises Provided: Patient instructed to cont prior HEP.  Exercises were reviewed and Lb was able to demonstrate them prior to the end of the session.  Lb demonstrated good  understanding of the education provided.     See EMR under Patient Instructions for exercises provided prior visit.    Assessment   Pt displayed increased strength and endurance during therex.  Pt cont to lack some LE strength and core stability.  Pt had no adverse effects from tx. Pt arrived 11 min late to tx today.        Lb is progressing well towards his goals.   Pt prognosis is Excellent.       Pt will continue to benefit from skilled outpatient physical therapy to address the deficits listed in the problem list box on initial evaluation, provide pt/family education and to maximize pt's level of independence in the home and community environment.     Pt's spiritual, cultural and educational needs considered and pt agreeable to plan of care and goals.     Anticipated Barriers for therapy: none    Goals:  Short Term Goals: 4 weeks   1. Independent with HEP.  2. Report decreased R  LE/tailbone pain < or =  5/10 with adls such assitting, getting out of bed/chair, twisting, bending, and turning in bed.   3. Increased MMT for B LE by 1 muscle grade to promote proper pelvic stability to decrease R LE/tailbone pain < or =  5/10 with adls such assitting, getting out of bed/chair, twisting, bending, and turning in bed.    4. Community ambulation with cane with mod I.     Long Term Goals: 8 weeks   5. Report decreased R LE/tailbone < or =  3/10 with adls such as sitting, getting out of bed/chair, twisting, bending, and turning in bed.  6. Increased MMT for B LE to 5/5 muscle grade to promote proper pelvic stability to decrease R LE/tailbone < or =  3/10 with adls such as sitting, getting out of bed/chair, twisting, bending, and turning in bed.  7. I community ambulation.   8. Patient to achieve CJ (at least 20% < 40% impaired, limited or restricted) level on the FOTO Outcomes Measurement System.      Plan   Cont to progress towards goals set by PT.  Work to increase core stability and hip strength next visit.      Ben Trammell, PTA

## 2020-11-09 ENCOUNTER — CLINICAL SUPPORT (OUTPATIENT)
Dept: REHABILITATION | Facility: OTHER | Age: 20
End: 2020-11-09
Payer: COMMERCIAL

## 2020-11-09 DIAGNOSIS — Z74.09 IMPAIRED FUNCTIONAL MOBILITY, BALANCE, GAIT, AND ENDURANCE: ICD-10-CM

## 2020-11-09 DIAGNOSIS — M62.81 MUSCLE WEAKNESS OF LOWER EXTREMITY: ICD-10-CM

## 2020-11-09 DIAGNOSIS — R10.2 ACUTE PELVIC PAIN: ICD-10-CM

## 2020-11-09 PROCEDURE — 97110 THERAPEUTIC EXERCISES: CPT | Mod: PN,CQ

## 2020-11-09 NOTE — PROGRESS NOTES
"    Physical Therapy Daily Treatment Note     Name: Lb Friedman  Clinic Number: [id number]    Therapy Diagnosis:   Encounter Diagnoses   Name Primary?    Acute pelvic pain     Impaired functional mobility, balance, gait, and endurance     Muscle weakness of lower extremity      Physician: Fantasma Torres PA    Visit Date: 11/9/2020    Physician Orders: PT Eval and Treat   Medical Diagnosis: S32.591A (ICD-10-CM) - Closed fracture of ramus of right pubis, S22.41XA (ICD-10-CM) - Closed fracture of multiple ribs of right side, S32.82XA (ICD-10-CM) - Multiple closed fractures of pelvis  Evaluation Date: 10/19/2020  Authorization Period Expiration: 12/31/2020  Plan of Care Certification Period: 12/14/2020  Visit # / Visits authorized: 4/ 20     Time In: 0945  Time Out: 1030   Total Billable Time: 45 minutes    Precautions: Standard and WBAT      Subjective     Pt states feeling better w/ no c/o pn currently. States he has not been wearing his back brace as he feels like he is not needing it as much. States he has a follow up appointment with his neurologist on 11/14/2020.  He was compliant with home exercise program.  Response to previous treatment:no adverse effects  Functional change: no change     Prior Level of Function: I with amb and ADL  Current Level of Function: amb with walker, A with dressing and putting on brace.     Pain:  Current 0/10, worst 9/10, best 4/10   Location: right tailbone   Description: Sharp, Shooting and constant  Aggravating Factors: sitting, getting out of bed/chair, twisting, bending, and turning in bed.  Easing Factors: relaxation and pain medication     Pts goals: "to be able to run." states "the [employer] will not let me commission if I cannot run."      Objective     Lb received therapeutic exercises to develop strength, ROM, flexibility, posture and core stabilization for 45 minutes including:    +Treadmill walking 2.0 mph 5'  Side lying clams 2x15  Side lying hip abd 3 x " "10  Side lying reverse clams 2x15  Gluteal sets 10x10"   TrA 20 x 3 sec hold   Ball squeezes sub-maximal 30x 3 sec hold   Hook-lying hip abd sub-maximal 30x 3 sec hold   +HR in RW 30x  St. HS curls 30x 2# B   St. Hip abd 30 x 2# B  +St. Hip ext 30x 2# B  +Step ups 4" 30x  +Rows w/TA otb 30x  Mini squats 30 x (VCs needed)          Lb received the following manual therapy techniques: Myofacial release and Soft tissue Mobilization were applied to the: 00 for 00 minutes, including:  NP    Lb participated in neuromuscular re-education activities to improve: Balance, Coordination, Kinesthetic, Sense, Proprioception and Posture for 00 minutes. The following activities were included:  NP    Lb participated in gait training to improve functional mobility and safety for 00  minutes, including:  NP      Lb received hot pack for 00 minutes to NP.    Lb received cold pack for 00 minutes to NP.    Home Exercises Provided and Patient Education Provided     Education provided:   Pt edu on proper exercise technique.        Written Home Exercises Provided: Patient instructed to cont prior HEP.  Exercises were reviewed and Lb was able to demonstrate them prior to the end of the session.  Lb demonstrated good  understanding of the education provided.     See EMR under Patient Instructions for exercises provided prior visit.    Assessment   Pt tolerated exercise well. Endurance was vastly improved today with increased repetitions  performed as well as decreased periods of rest between sets required. Progressed core strengthening /treadmill walking for improved core strength and stabilization for improved posture. PT/PTA face to face conference with Julio Finley DPT concerning pt status/TX.       Lb is progressing well towards his goals.   Pt prognosis is Excellent.       Pt will continue to benefit from skilled outpatient physical therapy to address the deficits listed in the problem list box on initial " evaluation, provide pt/family education and to maximize pt's level of independence in the home and community environment.     Pt's spiritual, cultural and educational needs considered and pt agreeable to plan of care and goals.     Anticipated Barriers for therapy: none    Goals:  Short Term Goals: 4 weeks   1. Independent with HEP.  2. Report decreased R LE/tailbone pain < or =  5/10 with adls such assitting, getting out of bed/chair, twisting, bending, and turning in bed.   3. Increased MMT for B LE by 1 muscle grade to promote proper pelvic stability to decrease R LE/tailbone pain < or =  5/10 with adls such assitting, getting out of bed/chair, twisting, bending, and turning in bed.    4. Community ambulation with cane with mod I.     Long Term Goals: 8 weeks   5. Report decreased R LE/tailbone < or =  3/10 with adls such as sitting, getting out of bed/chair, twisting, bending, and turning in bed.  6. Increased MMT for B LE to 5/5 muscle grade to promote proper pelvic stability to decrease R LE/tailbone < or =  3/10 with adls such as sitting, getting out of bed/chair, twisting, bending, and turning in bed.  7. I community ambulation.   8. Patient to achieve CJ (at least 20% < 40% impaired, limited or restricted) level on the F[employer] Outcomes Measurement System.      Plan   Cont to progress towards goals set by PT.  Work to increase core stability and hip strength next visit.      Luis Lozoya, PTA